# Patient Record
Sex: FEMALE | Race: WHITE | NOT HISPANIC OR LATINO | Employment: FULL TIME | URBAN - METROPOLITAN AREA
[De-identification: names, ages, dates, MRNs, and addresses within clinical notes are randomized per-mention and may not be internally consistent; named-entity substitution may affect disease eponyms.]

---

## 2017-06-07 ENCOUNTER — HOSPITAL ENCOUNTER (OUTPATIENT)
Dept: RADIOLOGY | Facility: CLINIC | Age: 52
Discharge: HOME | End: 2017-06-07
Payer: COMMERCIAL

## 2017-06-07 ENCOUNTER — ALLSCRIPTS OFFICE VISIT (OUTPATIENT)
Dept: OTHER | Facility: OTHER | Age: 52
End: 2017-06-07

## 2017-06-07 DIAGNOSIS — M25.561 PAIN IN RIGHT KNEE: ICD-10-CM

## 2017-06-07 PROCEDURE — 73562 X-RAY EXAM OF KNEE 3: CPT

## 2018-01-13 VITALS
BODY MASS INDEX: 29.14 KG/M2 | HEART RATE: 66 BPM | HEIGHT: 68 IN | SYSTOLIC BLOOD PRESSURE: 143 MMHG | DIASTOLIC BLOOD PRESSURE: 74 MMHG | WEIGHT: 192.25 LBS

## 2018-04-24 ENCOUNTER — APPOINTMENT (OUTPATIENT)
Dept: RADIOLOGY | Facility: CLINIC | Age: 53
End: 2018-04-24
Payer: COMMERCIAL

## 2018-04-24 ENCOUNTER — OFFICE VISIT (OUTPATIENT)
Dept: OBGYN CLINIC | Facility: CLINIC | Age: 53
End: 2018-04-24
Payer: COMMERCIAL

## 2018-04-24 VITALS
SYSTOLIC BLOOD PRESSURE: 155 MMHG | WEIGHT: 162.8 LBS | HEIGHT: 68 IN | BODY MASS INDEX: 24.67 KG/M2 | HEART RATE: 62 BPM | DIASTOLIC BLOOD PRESSURE: 79 MMHG

## 2018-04-24 DIAGNOSIS — M25.561 RIGHT KNEE PAIN, UNSPECIFIED CHRONICITY: ICD-10-CM

## 2018-04-24 DIAGNOSIS — S83.421A SPRAIN OF LATERAL COLLATERAL LIGAMENT OF RIGHT KNEE, INITIAL ENCOUNTER: ICD-10-CM

## 2018-04-24 DIAGNOSIS — M25.561 RIGHT KNEE PAIN, UNSPECIFIED CHRONICITY: Primary | ICD-10-CM

## 2018-04-24 PROCEDURE — 73562 X-RAY EXAM OF KNEE 3: CPT

## 2018-04-24 PROCEDURE — 99214 OFFICE O/P EST MOD 30 MIN: CPT | Performed by: ORTHOPAEDIC SURGERY

## 2018-04-24 RX ORDER — ATORVASTATIN CALCIUM 40 MG/1
TABLET, FILM COATED ORAL
COMMUNITY
Start: 2017-10-19 | End: 2020-02-25

## 2018-04-24 NOTE — PROGRESS NOTES
Assessment/Plan:  1  Right knee pain, unspecified chronicity  XR knee 3 vw right non injury       This is likely an LCL sprain of the right knee  She also however has joint line tenderness on that side  It is certainly less so on the medial side  I am concerned as well about lateral meniscal tearing and bone bruising  The ACL feels stable to me however  I would like to have an MRI to further evaluate  She will in the meantime utilized a brace that she has at home and also continue to ice the knee to get the swelling down  I will speak with her over the phone about the MRI results  Subjective:   Alanna Mcclendon is a 46 y o  female who presents with right knee pain after she suffered a skiing accident this past weekend  She was skiing and twisted the right knee and fell  Her pain is been a moderate and dull ache that is intermittent  It is worse when she tries to flex the knee  It was quite swollen and has gone down a bit with icing  It is better certainly with rest   The pain mostly radiates laterally  She did not feel a pop  Review of Systems   Constitutional: Negative  Negative for chills and fever  HENT: Negative  Negative for ear pain and sore throat  Eyes: Negative  Negative for pain and redness  Respiratory: Negative  Negative for shortness of breath and wheezing  Cardiovascular: Negative for chest pain and palpitations  Gastrointestinal: Negative  Negative for abdominal pain and blood in stool  Endocrine: Negative  Negative for polydipsia and polyuria  Genitourinary: Negative  Negative for difficulty urinating and dysuria  Musculoskeletal:        As noted in HPI   Skin: Negative  Negative for pallor and rash  Neurological: Negative  Negative for dizziness and numbness  Hematological: Negative  Negative for adenopathy  Does not bruise/bleed easily  Psychiatric/Behavioral: Negative  Negative for confusion and suicidal ideas  History reviewed   No pertinent past medical history  Past Surgical History:   Procedure Laterality Date    CARDIAC SURGERY  2016    LED    CYST REMOVAL      GANGLION CYST REMOVAL B/L HAND    KNEE SURGERY      ACL SURG RIGHT KNEE       Family History   Problem Relation Age of Onset    No Known Problems Mother     No Known Problems Father        Social History     Occupational History    Not on file  Social History Main Topics    Smoking status: Never Smoker    Smokeless tobacco: Never Used    Alcohol use No    Drug use: No    Sexual activity: Not on file         Current Outpatient Prescriptions:     Aspirin (ASPIR-81 PO), Take 81 mg by mouth, Disp: , Rfl:     atorvastatin (LIPITOR) 40 mg tablet, take 1 tablet by mouth once daily, Disp: , Rfl:     No Known Allergies    Objective:  Vitals:    04/24/18 1331   BP: 155/79   Pulse: 62       Right Knee Exam     Tenderness   The patient is experiencing tenderness in the lateral joint line and LCL  Range of Motion   Extension:  -15 abnormal   Flexion:  80 abnormal     Tests   Lachman:  Anterior - negative      Drawer:       Anterior - negative    Posterior - negative  Varus: positive  Valgus: negative    Other   Erythema: absent  Scars: present  Sensation: normal  Pulse: present  Other tests: effusion present          Observations     Right Knee   Positive for effusion  Physical Exam   Constitutional: She is oriented to person, place, and time  She appears well-developed and well-nourished  HENT:   Head: Normocephalic and atraumatic  Eyes: Conjunctivae and EOM are normal    Neck: Normal range of motion  Cardiovascular: Intact distal pulses  Pulmonary/Chest: Effort normal  No respiratory distress  Musculoskeletal:        Right knee: She exhibits effusion  Neurological: She is alert and oriented to person, place, and time  Skin: Skin is warm and dry  Psychiatric: She has a normal mood and affect   Her behavior is normal        I have personally reviewed pertinent films in PACS and my interpretation is as follows:  X-rays of the right knee demonstrate intact hardware from previous ACL reconstruction and mild medial compartment arthritis  The left knee incidentally has moderate medial compartment arthritis

## 2018-04-24 NOTE — PATIENT INSTRUCTIONS
Knee Sprain   WHAT YOU NEED TO KNOW:   What is a knee sprain? A knee sprain occurs when one or more ligaments in your knee are suddenly stretched or torn  Ligaments are tissues that hold bones together  Ligaments support the knee and keep the joint and bones in the correct position  What causes a knee sprain? · A sudden twisting of the knee joint  may cause a knee sprain  This may happen when you run, jump and land, or stop or change direction suddenly  Activities that cause your knee to extend more than normal can also cause a sprain  Sprains commonly occur in sports such as football, basketball, hockey, and skiing  · Direct hits to the knee  may cause a sprain  Sprains may be caused by hits to the front, sides, or back of the knees  Sprains may be caused by falling onto your knees while they are bent  A sprain may also happen during a car accident  What increases my risk for a knee sprain? · Lack of the correct shoes or protective gear     · No warm up or stretching before exercise    · Excessive exercise or a sudden increase in exercise     · A previous sprain  What are the signs and symptoms of a knee sprain? · Stiffness or decreased movement     · Pain or tenderness     · Painful pop that you can hear or feel    · Swelling or bruising    · Knee that regan or gives out when you try to walk  How is a knee sprain diagnosed? Your healthcare provider will ask you about your injury and symptoms, and examine you  Tell him or her if you heard a snap or pop when you were injured  Your healthcare provider will check the movement and strength of your knee joint  An x-ray, CT scan or MRI may show the sprain or other damage to your knee  You may be given contrast liquid to help your injury show up better in the pictures  Tell the healthcare provider if you have ever had an allergic reaction to contrast liquid  Do not enter the MRI room with anything metal  Metal can cause serious injury   Tell the healthcare provider if you have any metal in or on your body  How is a knee sprain treated? Treatment depends on the type and cause of your knee sprain  You may need any of the following:  · NSAIDs , such as ibuprofen, help decrease swelling, pain, and fever  This medicine is available with or without a doctor's order  NSAIDs can cause stomach bleeding or kidney problems in certain people  If you take blood thinner medicine, always ask your healthcare provider if NSAIDs are safe for you  Always read the medicine label and follow directions  · Acetaminophen  decreases pain and fever  It is available without a doctor's order  Ask how much to take and how often to take it  Follow directions  Read the labels of all other medicines you are using to see if they also contain acetaminophen, or ask your doctor or pharmacist  Acetaminophen can cause liver damage if not taken correctly  Do not use more than 4 grams (4,000 milligrams) total of acetaminophen in one day  · Prescription pain medicine  may be given  Ask how to take this medicine safely  · Support devices  such as a splint or brace may be needed  These devices limit movement and protect your joint while it heals  You may be given crutches to use until you can stand on your injured leg without pain  Use devices as directed  · Physical therapy  may be needed  A physical therapist teaches you exercises to help improve movement and strength, and to decrease pain  · Surgery  may be needed if other treatments do not work or your strain is severe  Surgery may include a knee arthroscopy to look inside your knee joint and repair damage  How can I manage my knee sprain? · Rest  your knee and do not exercise  You may be told to keep weight off your knee  This means that you should not walk on your injured leg  Rest helps decrease swelling and allows the injury to heal  You can do gentle range of motion (ROM) exercises as directed   This will prevent stiffness  · Apply ice  on your knee for 15 to 20 minutes every hour or as directed  Use an ice pack, or put crushed ice in a plastic bag  Cover it with a towel  Ice helps prevent tissue damage and decreases swelling and pain  · Apply compression to your knee as directed  You may need to wear an elastic bandage  This helps keep your injured knee from moving too much while it heals  You can loosen or tighten the elastic bandage to make it comfortable  It should be tight enough for you to feel support  It should not be so tight that it causes your toes to feel numb or tingly  If you are wearing an elastic bandage, take it off and rewrap it once a day  · Elevate your knee  above the level of your heart as often as you can  This will help decrease swelling and pain  Prop your leg on pillows or blankets to keep it elevated comfortably  Do not put pillows directly behind your knee  How can I prevent another knee sprain? Exercise your legs to keep your muscles strong  Strong leg muscles help protect your knee and prevent strain  The following may also prevent a knee sprain:  · Slowly start your exercise or training program   Slowly increase the time, distance, and intensity of your exercise  Sudden increases in training may cause you to injure your knee again  · Wear protective braces and equipment as directed  Braces may prevent your knee from moving the wrong way and causing another sprain  Protective equipment may support your bones and ligaments to prevent injury  · Warm up and stretch before exercise  Warm up by walking or using an exercise bike before starting your regular exercise  Do gentle stretches after warming up  This helps to loosen your muscles and decrease stress on your knee  Cool down and stretch after you exercise  · Wear shoes that fit correctly and support your feet  Replace your running or exercise shoes before the padding or shock absorption is worn out   Ask your healthcare provider which exercise shoes are best for you  Ask if you should wear special shoe inserts  Shoe inserts can help support your heels and arches or keep your foot lined up correctly in your shoes  Exercise on flat surfaces  When should I seek care immediately? · Any part of your leg feels cold, numb, or looks pale     When should I contact my healthcare provider? · You have new or increased swelling, bruising, or pain in your knee  · Your symptoms do not improve within 6 weeks, even with treatment  · You have questions or concerns about your condition or care  CARE AGREEMENT:   You have the right to help plan your care  Learn about your health condition and how it may be treated  Discuss treatment options with your caregivers to decide what care you want to receive  You always have the right to refuse treatment  The above information is an  only  It is not intended as medical advice for individual conditions or treatments  Talk to your doctor, nurse or pharmacist before following any medical regimen to see if it is safe and effective for you  © 2017 [employer]  Information is for End User's use only and may not be sold, redistributed or otherwise used for commercial purposes  All illustrations and images included in CareNotes® are the copyrighted property of [employer]  or [employer]

## 2018-04-26 ENCOUNTER — HOSPITAL ENCOUNTER (OUTPATIENT)
Dept: RADIOLOGY | Facility: HOSPITAL | Age: 53
Discharge: HOME | End: 2018-04-26
Attending: ORTHOPAEDIC SURGERY
Payer: COMMERCIAL

## 2018-04-26 DIAGNOSIS — M25.561 RIGHT KNEE PAIN, UNSPECIFIED CHRONICITY: ICD-10-CM

## 2018-04-26 PROCEDURE — 73721 MRI JNT OF LWR EXTRE W/O DYE: CPT

## 2018-04-29 DIAGNOSIS — S83.421A SPRAIN OF LATERAL COLLATERAL LIGAMENT OF RIGHT KNEE, INITIAL ENCOUNTER: Primary | ICD-10-CM

## 2020-02-25 ENCOUNTER — APPOINTMENT (OUTPATIENT)
Dept: RADIOLOGY | Facility: CLINIC | Age: 55
End: 2020-02-25
Payer: COMMERCIAL

## 2020-02-25 ENCOUNTER — OFFICE VISIT (OUTPATIENT)
Dept: OBGYN CLINIC | Facility: CLINIC | Age: 55
End: 2020-02-25
Payer: COMMERCIAL

## 2020-02-25 VITALS
SYSTOLIC BLOOD PRESSURE: 149 MMHG | HEIGHT: 68 IN | HEART RATE: 61 BPM | DIASTOLIC BLOOD PRESSURE: 84 MMHG | BODY MASS INDEX: 25.85 KG/M2 | WEIGHT: 170.6 LBS

## 2020-02-25 DIAGNOSIS — M25.551 PAIN IN RIGHT HIP: ICD-10-CM

## 2020-02-25 DIAGNOSIS — M46.1 SACROILIITIS (HCC): Primary | ICD-10-CM

## 2020-02-25 PROCEDURE — 73502 X-RAY EXAM HIP UNI 2-3 VIEWS: CPT

## 2020-02-25 PROCEDURE — 99214 OFFICE O/P EST MOD 30 MIN: CPT | Performed by: ORTHOPAEDIC SURGERY

## 2020-02-25 RX ORDER — VALSARTAN 80 MG/1
80 TABLET ORAL DAILY
COMMUNITY
Start: 2019-12-17

## 2020-02-25 NOTE — PROGRESS NOTES
Assessment/Plan:  1  Sacroiliitis, right [facility])  Ambulatory referral to Physical Therapy   2  Pain in right hip  XR hip/pelv 2-3 vws right if performed       Scribe Attestation    I,:   Brandi Henao am acting as a scribe while in the presence of the attending physician :        I,:   Jesu Jeffries MD personally performed the services described in this documentation    as scribed in my presence :          X-rays of the right hip demonstrates a small osteophyte along the acetabulum, with great preserved joint space  I do appreciate very minimal degenerative changes  Upon physical examination, she does demonstrate tenderness along her right sacroiliac joint, with no tenderness along her sciatic notch  I do believe she is demonstrating signs and symptoms consistent with sacroiliitis  At this time, I do believe we can treat this conservatively in the form of formal physical therapy  I provided her with a prescription for this today in the office  I did also provide her with the option of having one of our pain management physicians give her a steroid injection in that region for pain relief  She has declined this option as of now  I will have her follow up back in the office in 6 weeks for repeat clinical evaluation  If she does not see progression at that time, we can order an MRI to assess her discomfort further  Subjective:   Brinda Hooker is a 63 y o  female who presents to the office today for initial evaluation of right hip pain, with what she believes no true mechanism of injury  She states she has had pain for over a year now with no cessation of symptoms  She states her pain is increased when sleeping, however notes minimal pain when active  She states she has tried benign neglect and home exercises with no relief  At today's visit, she localizes majority of her pain and discomfort along her buttock region  She describes the pain as transient, stiff and moderate in intensity    She denies any radicular symptoms  She denies any numbness and tingling  She does recall an incident where she fell on her buttock roughly around a year ago, however is unsure whether this incident correlates with her pain  Review of Systems   Constitutional: Positive for activity change  Negative for chills, fever and unexpected weight change  HENT: Negative for hearing loss, nosebleeds and sore throat  Eyes: Negative for pain, redness and visual disturbance  Respiratory: Negative for cough, shortness of breath and wheezing  Cardiovascular: Negative for chest pain, palpitations and leg swelling  Gastrointestinal: Negative for abdominal pain, nausea and vomiting  Endocrine: Negative for polydipsia and polyuria  Genitourinary: Negative for dysuria and hematuria  Musculoskeletal: Positive for arthralgias and myalgias  See HPI   Skin: Negative for rash and wound  Neurological: Negative for dizziness, numbness and headaches  Psychiatric/Behavioral: Negative for decreased concentration and suicidal ideas  The patient is not nervous/anxious            Past Medical History:   Diagnosis Date    Hypertension        Past Surgical History:   Procedure Laterality Date    CARDIAC SURGERY  2016    LED    CYST REMOVAL      GANGLION CYST REMOVAL B/L HAND    KNEE SURGERY      ACL SURG RIGHT KNEE       Family History   Problem Relation Age of Onset    No Known Problems Mother     No Known Problems Father     No Known Problems Sister     No Known Problems Brother     No Known Problems Maternal Aunt     No Known Problems Maternal Uncle     No Known Problems Paternal Aunt     No Known Problems Paternal Uncle     No Known Problems Maternal Grandmother     No Known Problems Maternal Grandfather     No Known Problems Paternal Grandmother     No Known Problems Paternal Grandfather        Social History     Occupational History    Not on file   Tobacco Use    Smoking status: Never Smoker    Smokeless tobacco: Never Used   Substance and Sexual Activity    Alcohol use: No    Drug use: No    Sexual activity: Not on file         Current Outpatient Medications:     Aspirin (ASPIR-81 PO), Take 81 mg by mouth, Disp: , Rfl:     valsartan (DIOVAN) 80 mg tablet, Take 80 mg by mouth daily, Disp: , Rfl:     No Known Allergies    Objective:  Vitals:    02/25/20 0900   BP: 149/84   Pulse: 61       Right Hip Exam     Tenderness   Right hip tenderness location: sacroiliac joint  Range of Motion   Flexion:  100 normal   External rotation:  60 normal   Internal rotation:  20 normal     Muscle Strength   Abduction: 5/5   Adduction: 5/5   Flexion: 5/5     Tests   TIFFANI: negative    Other   Erythema: absent  Sensation: normal  Pulse: present    Comments:    Compression test at the pelvis (-)  Distraction test at the pelvis (-)  Stinchfield test (-)                Physical Exam   Constitutional: She is oriented to person, place, and time  She appears well-developed and well-nourished  HENT:   Head: Normocephalic and atraumatic  Eyes: Pupils are equal, round, and reactive to light  Conjunctivae are normal  Right eye exhibits no discharge  Left eye exhibits no discharge  Neck: Normal range of motion  Neck supple  Cardiovascular: Normal rate and intact distal pulses  Pulmonary/Chest: Effort normal  No respiratory distress  Musculoskeletal:   As noted in HPI  Neurological: She is alert and oriented to person, place, and time  Skin: Skin is warm and dry  Vitals reviewed  I have personally reviewed pertinent films in PACS and my interpretation is as follows:  X-rays of the right hip obtained on 02/25/2020 demonstrates a small osteophyte along the acetabulum, with otherwise preserved joint space  I do appreciate very minimal degenerative changes

## 2020-03-03 ENCOUNTER — EVALUATION (OUTPATIENT)
Dept: PHYSICAL THERAPY | Facility: CLINIC | Age: 55
End: 2020-03-03
Payer: COMMERCIAL

## 2020-03-03 DIAGNOSIS — M46.1 SACROILIITIS (HCC): ICD-10-CM

## 2020-03-03 PROCEDURE — 97161 PT EVAL LOW COMPLEX 20 MIN: CPT

## 2020-03-03 NOTE — PROGRESS NOTES
PT Evaluation     Today's date: 3/3/2020  Patient name: Jake Knight  : 1965  MRN: [id number]  Referring provider: Tyron Oshea MD  Dx:   Encounter Diagnosis     ICD-10-CM    1  Sacroiliitis, right ([facility]) M46 1 Ambulatory referral to Physical Therapy                  Assessment  Assessment details: Jake Knight is a 63 y o  female who presents with signs and symptoms consistent with the referring diagnosis of sacroiliitis, right  Patient presents with the following impairments: pain, decreased strength, decreased ROM, decreased joint mobility, decreased flexibility of gastrocs, hamstrings, quadriceps musculature, postural dysfunction, antalgic gait and decreased static/dynamic balance  Due to these impairments, patient has difficulty performing the following: ADL's, recreational activities, ambulation, stair negotiation, lifting/carrying, prolonged sitting, prolonged standing, falling asleep, staying asleep, squatting, household chores, yard work, and shopping  Patient has been educated in home exercise program and plan of care  Patient would benefit from skilled physical therapy services to address the above functional limitations and progress towards prior level of function and independence with home exercise program   Impairments: abnormal gait, abnormal muscle firing, abnormal or restricted ROM, activity intolerance, impaired physical strength, lacks appropriate home exercise program, pain with function and poor body mechanics  Understanding of Dx/Px/POC: good   Prognosis: good    Goals  Short Term Goals (3 weeks)  1  Patient will be independent with HEP  2  Patient will demonstrate an increase in right hip AROM to WNL  3  Patient will demonstrate an increase in right hip strength of 1/2 grade on MMT  4  Patient will be able to perform a squat to a chair with proper form without pain  Long Term Goals (6 weeks)  1   Patient will demonstrate an increase in right hip strength to at least 4+/5 on MMT in order to negotiate stairs/curbs  2  Patient will no longer complain of sleep disturbance due to right hip pain  3  Patient will be able to ascend/descend 15 stairs reciprocally without pain  4  Patient will be able to perform a full, functional squat with proper mechanics  Plan  Patient would benefit from: skilled physical therapy  Planned modality interventions: cryotherapy, electrical stimulation/Russian stimulation, TENS, ultrasound, thermotherapy: hydrocollator packs, unattended electrical stimulation, high voltage pulsed current: pain management and high voltage pulsed current: spasm management  Planned therapy interventions: flexibility, functional ROM exercises, graded exercise, home exercise program, joint mobilization, manual therapy, neuromuscular re-education, patient education, strengthening, stretching, therapeutic exercise, therapeutic activities, [place] taping, balance/weight bearing training, gait training, abdominal trunk stabilization, activity modification, balance, body mechanics training, graded activity, self care, postural training, IADL retraining, ADL training, breathing training, behavior modification, muscle pump exercises, therapeutic training and transfer training  Frequency: 2x week  Duration in weeks: 6  Treatment plan discussed with: patient        Subjective Evaluation    History of Present Illness  Date of onset: 3/3/2019  Mechanism of injury: Pt reports that she has had right hip pain for over one year  Pt states she is very active and goes skiiing  Pt states that a year and a half ago, she tripped and fell in a parking lot  Pt states she is unsure if this is the cause of her pain  Pt states she saw Dr Stanton Joseph last week and was diagnosed with sacroillitis  X-ray of R hip revealed minimal degenerative changes in R hip with presence of osteophyte in the acetabulum as per MD report   Pt states that her pain is worst at night and wakes her up on most nights, about 2-3x on average  Pt denies red flag symptoms including bladder/bowel dysfunction, unexplained weight change, fever, chills, malaise, etc  Pt was referred to outpatient PT  Pain  Current pain ratin  At best pain ratin  At worst pain rating: 3  Location: Right hip   Quality: dull ache  Aggravating factors: sitting, standing, walking and lifting  Progression: no change    Social Support  Steps to enter house: yes  3  Stairs in house: yes   13  Lives in: multiple-level home  Lives with: spouse and adult children    Employment status: working ([employer])  Hand dominance: right      Diagnostic Tests  X-ray: abnormal  Treatments  No previous or current treatments  Patient Goals  Patient goals for therapy: decreased pain, increased motion, increased strength, independence with ADLs/IADLs and return to sport/leisure activities          Objective     Tenderness     Right Hip   Tenderness in the greater trochanter and sacroiliac joint  Neurological Testing     Sensation     Hip   Left Hip   Intact: light touch    Right Hip   Intact: light touch    Active Range of Motion   Left Hip   Normal active range of motion  Flexion: 110 degrees   Abduction: 55 degrees   External rotation (90/90): 35 degrees   Internal rotation (90/90): 25 degrees     Right Hip   Flexion: 120 degrees   Abduction: 45 degrees   External rotation (90/90): 35 degrees   Internal rotation (90/90): 35 degrees     Passive Range of Motion   Left Hip   Normal passive range of motion  Flexion: 120 degrees     Joint Play     Right Hip     Hypomobile in the posterior hip capsule and anterior hip capsule    Strength/Myotome Testing     Left Hip   Normal muscle strength    Right Hip   Planes of Motion   Flexion: 4  Abduction: 4-  External rotation: 4-  Internal rotation: 3    Tests     Right Hip   Positive Ely's  Negative CHAD NIXON and kim  Shilo: Positive  90/90 SLR: Positive  SLR: Negative       Ambulation     Observational Gait Gait: within functional limits     Functional Assessment      Squat    Pain  Forward Step Up 6"   Left Leg  Within functional limits  Right Leg  Increased contralateral push off                Precautions: HTN    Daily Treatment Diary     Manual  3/3                                                   Exercise Diary  EVAL       SKTC stretch Instructed       Prone quad stretch Instructed       Glute sets        Ball squeeze        LTR        SLR abd, ext        Clamshells        SLS                                                                                Pt edu/HEP Performed               Time            Modalities

## 2020-03-05 ENCOUNTER — APPOINTMENT (OUTPATIENT)
Dept: PHYSICAL THERAPY | Facility: CLINIC | Age: 55
End: 2020-03-05
Payer: COMMERCIAL

## 2020-03-10 ENCOUNTER — APPOINTMENT (OUTPATIENT)
Dept: PHYSICAL THERAPY | Facility: CLINIC | Age: 55
End: 2020-03-10
Payer: COMMERCIAL

## 2021-03-30 DIAGNOSIS — Z23 ENCOUNTER FOR IMMUNIZATION: ICD-10-CM
